# Patient Record
Sex: FEMALE | Race: WHITE | URBAN - METROPOLITAN AREA
[De-identification: names, ages, dates, MRNs, and addresses within clinical notes are randomized per-mention and may not be internally consistent; named-entity substitution may affect disease eponyms.]

---

## 2024-02-19 ENCOUNTER — OFFICE VISIT (OUTPATIENT)
Dept: URGENT CARE | Facility: CLINIC | Age: 34
End: 2024-02-19
Payer: COMMERCIAL

## 2024-02-19 VITALS
SYSTOLIC BLOOD PRESSURE: 109 MMHG | WEIGHT: 145 LBS | OXYGEN SATURATION: 98 % | RESPIRATION RATE: 16 BRPM | TEMPERATURE: 99 F | HEIGHT: 64 IN | BODY MASS INDEX: 24.75 KG/M2 | DIASTOLIC BLOOD PRESSURE: 73 MMHG | HEART RATE: 67 BPM

## 2024-02-19 DIAGNOSIS — Z32.02 PREGNANCY TEST NEGATIVE: ICD-10-CM

## 2024-02-19 DIAGNOSIS — S99.912A ANKLE INJURY, LEFT, INITIAL ENCOUNTER: Primary | ICD-10-CM

## 2024-02-19 DIAGNOSIS — S92.255A CLOSED NONDISPLACED FRACTURE OF NAVICULAR BONE OF LEFT FOOT, INITIAL ENCOUNTER: ICD-10-CM

## 2024-02-19 DIAGNOSIS — S99.922A FOOT INJURY, LEFT, INITIAL ENCOUNTER: ICD-10-CM

## 2024-02-19 DIAGNOSIS — R26.89 IMPAIRED GAIT AND MOBILITY: ICD-10-CM

## 2024-02-19 LAB
B-HCG UR QL: NEGATIVE
CTP QC/QA: YES

## 2024-02-19 PROCEDURE — 99204 OFFICE O/P NEW MOD 45 MIN: CPT | Mod: S$GLB,,, | Performed by: FAMILY MEDICINE

## 2024-02-19 PROCEDURE — 81025 URINE PREGNANCY TEST: CPT | Mod: S$GLB,,, | Performed by: FAMILY MEDICINE

## 2024-02-19 PROCEDURE — 73610 X-RAY EXAM OF ANKLE: CPT | Mod: LT,S$GLB,, | Performed by: RADIOLOGY

## 2024-02-19 PROCEDURE — 73630 X-RAY EXAM OF FOOT: CPT | Mod: LT,S$GLB,, | Performed by: RADIOLOGY

## 2024-02-19 RX ORDER — IBUPROFEN 800 MG/1
800 TABLET ORAL 3 TIMES DAILY PRN
Qty: 21 TABLET | Refills: 0 | Status: SHIPPED | OUTPATIENT
Start: 2024-02-19 | End: 2024-02-26

## 2024-02-19 NOTE — PROGRESS NOTES
"Subjective:      Patient ID: Jade Tiwari is a 33 y.o. female.    Vitals:  height is 5' 4" (1.626 m) and weight is 65.8 kg (145 lb). Her oral temperature is 98.9 °F (37.2 °C). Her blood pressure is 109/73 and her pulse is 67. Her respiration is 16 and oxygen saturation is 98%.     Chief Complaint: Ankle Injury (Left ankle) and Foot Injury (Left foot)    Patient reporrts she had a miss step yesterday and rolled left foot and ankle.Patient took nothing for the pain but did ice and elevate left foot.    Ankle Injury   The incident occurred 12 to 24 hours ago. The incident occurred in the street. Injury mechanism: rolled left ankle/foot. The pain is present in the left ankle. The pain is at a severity of 6/10. The pain has been Constant since onset. Associated symptoms include an inability to bear weight and tingling. Pertinent negatives include no numbness. She reports no foreign bodies present. She has tried ice and elevation for the symptoms. The treatment provided no relief.   Foot Injury   The incident occurred 12 to 24 hours ago. The incident occurred in the street. Injury mechanism: rooled left foot/ankle. The pain is present in the left foot. The pain is at a severity of 6/10. The pain has been Constant since onset. Associated symptoms include an inability to bear weight and tingling. Pertinent negatives include no numbness. She reports no foreign bodies present. She has tried elevation and ice for the symptoms. The treatment provided no relief.       Neurological:  Negative for numbness.      Objective:     Vitals:    02/19/24 1104   BP: 109/73   BP Location: Left arm   Patient Position: Sitting   BP Method: Medium (Automatic)   Pulse: 67   Resp: 16   Temp: 98.9 °F (37.2 °C)   TempSrc: Oral   SpO2: 98%   Weight: 65.8 kg (145 lb)   Height: 5' 4" (1.626 m)      Physical Exam   Musculoskeletal: Normal range of motion.         General: Tenderness (left dorsum mid foot, no tenderness of left ankle) present. No " swelling. Normal range of motion.      Comments: No open wounds. Peripheral pulses intact.   Neurological: She is alert. No sensory deficit. Gait abnormal.   Psychiatric: Thought content normal.     Results for orders placed or performed in visit on 02/19/24   POCT urine pregnancy   Result Value Ref Range    POC Preg Test, Ur Negative Negative     Acceptable Yes       XR FOOT COMPLETE 3 VIEW LEFT    Result Date: 2/19/2024  EXAMINATION: XR FOOT COMPLETE 3 VIEW LEFT CLINICAL HISTORY: .  Unspecified injury of left foot, initial encounter TECHNIQUE: AP, lateral and oblique views of the left foot were performed. COMPARISON: None FINDINGS: Bones are well mineralized.  Alignment is satisfactory and joint spaces appear adequately maintained.  As seen only on the lateral view, there are a couple of linear lucencies at the dorsal aspect of the navicular bone.  These are not appreciated on the other two views and there is no associated soft tissue swelling appreciated.  If there is point tenderness to this area, follow-up imaging in 10-14 days may be considered for further evaluation in order to completely exclude the possibility of an acute fracture in this area.     Linear lucency at the dorsal aspect of the navicular seen only on the lateral view.  No associated soft tissue swelling.  If there are symptoms referable to this area, follow-up imaging in 10-14 days may be considered for further evaluation. This report was flagged in [employer] as abnormal. Electronically signed by: Riki Harding MD Date:    02/19/2024 Time:    11:46    XR ANKLE COMPLETE 3 VIEW LEFT    Result Date: 2/19/2024  EXAMINATION: XR ANKLE COMPLETE 3 VIEW LEFT CLINICAL HISTORY: Unspecified injury of left ankle, initial encounter TECHNIQUE: AP, lateral and oblique views of the left ankle were performed. COMPARISON: None FINDINGS: Bones are well mineralized.  The ankle mortise is intact.  No fracture or dislocation is seen.  No soft tissue swelling  appreciated.     No acute abnormality Electronically signed by: Riki Harding MD Date:    02/19/2024 Time:    11:40    Assessment:     1. Ankle injury, left, initial encounter    2. Closed nondisplaced fracture of navicular bone of left foot, initial encounter    3. Foot injury, left, initial encounter    4. Impaired gait and mobility    5. Pregnancy test negative        Plan:       Ankle injury, left, initial encounter  -     XR ANKLE COMPLETE 3 VIEW LEFT; Future; Expected date: 02/19/2024  -     ibuprofen (ADVIL,MOTRIN) 800 MG tablet; Take 1 tablet (800 mg total) by mouth 3 (three) times daily as needed for Pain. Take with meals  Dispense: 21 tablet; Refill: 0  -     NON-PNEUMATIC WALKING BOOT FOR HOME USE    2. Closed nondisplaced fracture of navicular bone of left foot, initial encounter  -     ibuprofen (ADVIL,MOTRIN) 800 MG tablet; Take 1 tablet (800 mg total) by mouth 3 (three) times daily as needed for Pain. Take with meals  Dispense: 21 tablet; Refill: 0  -     NON-PNEUMATIC WALKING BOOT FOR HOME USE  -     Ambulatory referral/consult to Podiatry  Patient declines narcotic prescription    3. Foot injury, left, initial encounter  -     XR FOOT COMPLETE 3 VIEW LEFT; Future; Expected date: 02/19/2024    4. Impaired gait and mobility  -     CRUTCHES FOR HOME USE    5. Pregnancy test negative  -     POCT urine pregnancy    Patient Instructions   Follow up with podiatry for reevaluation and repeat imaging.  Call to schedule an appointment: [phone]

## 2024-02-19 NOTE — PATIENT INSTRUCTIONS
Follow up with podiatry for reevaluation and repeat imaging.  Call to schedule an appointment: [phone]

## 2024-02-22 ENCOUNTER — OFFICE VISIT (OUTPATIENT)
Dept: PODIATRY | Facility: CLINIC | Age: 34
End: 2024-02-22
Payer: COMMERCIAL

## 2024-02-22 VITALS
SYSTOLIC BLOOD PRESSURE: 102 MMHG | DIASTOLIC BLOOD PRESSURE: 71 MMHG | HEART RATE: 62 BPM | BODY MASS INDEX: 24.77 KG/M2 | WEIGHT: 145.06 LBS | HEIGHT: 64 IN

## 2024-02-22 DIAGNOSIS — S92.252A CLOSED DISPLACED FRACTURE OF NAVICULAR BONE OF LEFT FOOT, INITIAL ENCOUNTER: Primary | ICD-10-CM

## 2024-02-22 PROCEDURE — 99203 OFFICE O/P NEW LOW 30 MIN: CPT | Mod: S$GLB,,, | Performed by: PODIATRIST

## 2024-02-22 PROCEDURE — 99999 PR PBB SHADOW E&M-EST. PATIENT-LVL III: CPT | Mod: PBBFAC,,, | Performed by: PODIATRIST

## 2024-02-22 NOTE — PROGRESS NOTES
"Subjective:     Patient ID: Jade Tiwari is a 33 y.o. female.    Chief Complaint: Foot Injury (Left foot)    Jade is a 33 y.o. female who presents to the podiatry clinic  with complaint of  left foot pain. Onset of the symptoms was several days ago. Precipitating event: inversion injury to foot. Current symptoms include: ability to bear weight, but with some pain, pain with inversion of the foot, and swelling. Aggravating factors: any weight bearing, standing, and walking. Symptoms have progressed to a point and plateaued. Patient has had no prior foot problems. Evaluation to date: plain films: abnormal Type 2 dorsal navicular avulsion fracture . Treatment to date: ice and rest. Went to PCP who prescribed Boot and crutches. Has been using both daily. Patients rates pain 0/10 on pain scale. Overall pain improved but persistent with certain movements. 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     Vitals:    02/22/24 1417   BP: 102/71   Pulse: 62   Weight: 65.8 kg (145 lb 1 oz)   Height: 5' 4" (1.626 m)   PainSc: 0-No pain       Review of Systems   Constitutional: Negative for chills and fever.   Cardiovascular:  Negative for chest pain, claudication and leg swelling.   Respiratory:  Negative for cough and shortness of breath.    Skin:  Negative for dry skin and nail changes.   Musculoskeletal:  Positive for joint pain and stiffness. Negative for myalgias.        L foot injury/pain   Gastrointestinal:  Negative for nausea and vomiting.   Neurological:  Negative for numbness and paresthesias.   Psychiatric/Behavioral:  Negative for altered mental status.         Objective:     Physical Exam  Vitals reviewed.   Constitutional:       Appearance: She is well-developed.   HENT:      Head: Normocephalic.   Cardiovascular:      Pulses:           Dorsalis pedis pulses are 2+ on the right side and 2+ on the left side.        Posterior tibial pulses are 2+ on the right side and 2+ on the left side.      " Comments: DP and PT pulses are 2/4 bilaterally.        Pulmonary:      Effort: No respiratory distress.   Musculoskeletal:      Right lower leg: No edema.      Left lower leg: No edema.      Comments: L foot dorsal medial midfoot pain with palpation along navicular. Pain with eversion/inversion/DF/PF of L foot.       Skin:     General: Skin is warm and dry.      Findings: No erythema.      Comments: No open lesions, lacerations or wounds noted. Nails are normal to R 1-5 and L 1-5. Interdigital spaces clean, dry and intact b/l. No erythema noted to b/l foot. Skin texture normal. Pedal hair normal. No bruising or ecchymosis noted to L foot.      Neurological:      Mental Status: She is alert and oriented to person, place, and time.      Sensory: No sensory deficit.      Comments: Light touch, proprioception, and sharp/dull sensation are all intact bilaterally.     Psychiatric:         Behavior: Behavior normal.         Thought Content: Thought content normal.         Judgment: Judgment normal.           Assessment:      Encounter Diagnosis   Name Primary?    Closed displaced fracture of navicular bone of left foot, initial encounter Yes     Plan:     Jade was seen today for foot injury.    Diagnoses and all orders for this visit:    Closed displaced fracture of navicular bone of left foot, initial encounter      I counseled the patient on her conditions, their implications and medical management.    Type 2 dorsal navicular lip avulsion fracture.     Continue CAM boot and crutchces for protected ambulation. D/c crutches if no pain and feel stable in boot.     RICE for pain relief as directed. Pain is nominal at this time.     NSAIDs for any pain flare ups.     RTC 4-5 week for reassessment with follow up xray prior to appointment, sooner PRN

## 2024-03-28 ENCOUNTER — HOSPITAL ENCOUNTER (OUTPATIENT)
Dept: RADIOLOGY | Facility: HOSPITAL | Age: 34
Discharge: HOME | End: 2024-03-28
Attending: PODIATRIST
Payer: COMMERCIAL

## 2024-03-28 ENCOUNTER — PATIENT MESSAGE (OUTPATIENT)
Dept: PODIATRY | Facility: CLINIC | Age: 34
End: 2024-03-28

## 2024-03-28 ENCOUNTER — OFFICE VISIT (OUTPATIENT)
Dept: PODIATRY | Facility: CLINIC | Age: 34
End: 2024-03-28
Payer: COMMERCIAL

## 2024-03-28 VITALS
SYSTOLIC BLOOD PRESSURE: 114 MMHG | WEIGHT: 145.06 LBS | BODY MASS INDEX: 24.77 KG/M2 | HEIGHT: 64 IN | HEART RATE: 78 BPM | DIASTOLIC BLOOD PRESSURE: 78 MMHG

## 2024-03-28 DIAGNOSIS — S92.252D CLOSED AVULSION FRACTURE OF NAVICULAR BONE OF LEFT FOOT WITH ROUTINE HEALING, SUBSEQUENT ENCOUNTER: Primary | ICD-10-CM

## 2024-03-28 DIAGNOSIS — S92.252A CLOSED DISPLACED FRACTURE OF NAVICULAR BONE OF LEFT FOOT, INITIAL ENCOUNTER: ICD-10-CM

## 2024-03-28 PROCEDURE — 99213 OFFICE O/P EST LOW 20 MIN: CPT | Mod: S$GLB,,, | Performed by: PODIATRIST

## 2024-03-28 PROCEDURE — 73630 X-RAY EXAM OF FOOT: CPT | Mod: TC,LT

## 2024-03-28 PROCEDURE — 99999 PR PBB SHADOW E&M-EST. PATIENT-LVL III: CPT | Mod: PBBFAC,,, | Performed by: PODIATRIST

## 2024-03-28 PROCEDURE — 73630 X-RAY EXAM OF FOOT: CPT | Mod: 26,LT,, | Performed by: RADIOLOGY

## 2024-03-28 NOTE — PROGRESS NOTES
"Subjective:     Patient ID: Jade Tiwari is a 33 y.o. female.    Chief Complaint: Follow-up (Left foot)    Jade is a 33 y.o. female who presents to the podiatry clinic  with complaint of  left foot pain. Onset of the symptoms was several days ago. Precipitating event: inversion injury to foot. Current symptoms include: ability to bear weight, but with some pain, pain with inversion of the foot, and swelling. Aggravating factors: any weight bearing, standing, and walking. Symptoms have progressed to a point and plateaued. Patient has had no prior foot problems. Evaluation to date: plain films: abnormal Type 2 dorsal navicular avulsion fracture . Treatment to date: ice and rest. Went to PCP who prescribed Boot and crutches. Has been using both daily. Patients rates pain 0/10 on pain scale. Overall pain improved but persistent with certain movements. 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     03/28/2024: follow up for L foot injury and dorsal navicular avulsion fx. Doing better. Pain is nearly resolved. Still gets a twinge of pain here and there in certain positions. Has been in CAM boot since last visit. No new concerns. Did not get new xrays done yet.       Vitals:    03/28/24 0758   BP: 114/78   Pulse: 78   Weight: 65.8 kg (145 lb 1 oz)   Height: 5' 4" (1.626 m)   PainSc: 0-No pain       Review of Systems   Constitutional: Negative for chills and fever.   Cardiovascular:  Negative for chest pain, claudication and leg swelling.   Respiratory:  Negative for cough and shortness of breath.    Skin:  Negative for dry skin and nail changes.   Musculoskeletal:  Positive for joint pain and stiffness. Negative for myalgias.        L foot injury/pain, improved greatly   Gastrointestinal:  Negative for nausea and vomiting.   Neurological:  Negative for numbness and paresthesias.   Psychiatric/Behavioral:  Negative for altered mental status.         Objective:     Physical Exam  Vitals reviewed. "   Constitutional:       Appearance: She is well-developed.   HENT:      Head: Normocephalic.   Cardiovascular:      Pulses:           Dorsalis pedis pulses are 2+ on the right side and 2+ on the left side.        Posterior tibial pulses are 2+ on the right side and 2+ on the left side.      Comments: DP and PT pulses are 2/4 bilaterally.        Pulmonary:      Effort: No respiratory distress.   Musculoskeletal:      Right lower leg: No edema.      Left lower leg: No edema.      Comments: L foot dorsal medial midfoot minimal to no (improved greatly) pain with palpation along navicular. Pain with eversion/inversion/DF/PF of L foot, improved. Adequate strength of R foot/ankle muscles/tendons.       Skin:     General: Skin is warm and dry.      Findings: No erythema.      Comments: No open lesions, lacerations or wounds noted. Nails are normal to R 1-5 and L 1-5. Interdigital spaces clean, dry and intact b/l. No erythema noted to b/l foot. Skin texture normal. Pedal hair normal. No bruising or ecchymosis noted to L foot.      Neurological:      Mental Status: She is alert and oriented to person, place, and time.      Sensory: No sensory deficit.      Comments: Light touch, proprioception, and sharp/dull sensation are all intact bilaterally.     Psychiatric:         Behavior: Behavior normal.         Thought Content: Thought content normal.         Judgment: Judgment normal.           Assessment:      Encounter Diagnosis   Name Primary?    Closed avulsion fracture of navicular bone of left foot with routine healing, subsequent encounter Yes       Plan:     Jade was seen today for follow-up.    Diagnoses and all orders for this visit:    Closed avulsion fracture of navicular bone of left foot with routine healing, subsequent encounter  -     HME - OTHER        I counseled the patient on her conditions, their implications and medical management.    Type 2 dorsal navicular lip avulsion fracture.     D/c CAM boot.  Dispensed and applied ankle brace to affected side for added support of the joint and to decrease motion and improve pain and stability. Advised to use at all times while ambulating for 3 weeks minimum after which she can swtich to arch supports (below) and supportive shoes.     Recommended [employer] Total Arch Supports from [employer] to be worn in supportive shoes daily. Advised to remove original insole from shoe and replace with these.    RICE for pain relief as directed. Pain is greatly improved.     NSAIDs for any pain flare ups.     Get follow up xrays. Already ordered.     Moving to [place]. Follow up as needed.